# Patient Record
Sex: FEMALE | Race: BLACK OR AFRICAN AMERICAN | NOT HISPANIC OR LATINO | URBAN - METROPOLITAN AREA
[De-identification: names, ages, dates, MRNs, and addresses within clinical notes are randomized per-mention and may not be internally consistent; named-entity substitution may affect disease eponyms.]

---

## 2017-11-28 ENCOUNTER — EMERGENCY (EMERGENCY)
Facility: HOSPITAL | Age: 32
LOS: 1 days | End: 2017-11-28
Payer: MEDICAID

## 2017-11-28 PROCEDURE — 72125 CT NECK SPINE W/O DYE: CPT | Mod: 26

## 2017-11-28 PROCEDURE — 99284 EMERGENCY DEPT VISIT MOD MDM: CPT

## 2020-04-03 ENCOUNTER — EMERGENCY (EMERGENCY)
Facility: HOSPITAL | Age: 35
LOS: 0 days | Discharge: HOME | End: 2020-04-03
Payer: MEDICAID

## 2020-04-03 VITALS
OXYGEN SATURATION: 99 % | DIASTOLIC BLOOD PRESSURE: 57 MMHG | RESPIRATION RATE: 18 BRPM | TEMPERATURE: 99 F | HEART RATE: 79 BPM | SYSTOLIC BLOOD PRESSURE: 132 MMHG

## 2020-04-03 DIAGNOSIS — R43.9 UNSPECIFIED DISTURBANCES OF SMELL AND TASTE: ICD-10-CM

## 2020-04-03 DIAGNOSIS — U07.1 COVID-19: ICD-10-CM

## 2020-04-03 PROCEDURE — 99283 EMERGENCY DEPT VISIT LOW MDM: CPT

## 2020-04-03 PROCEDURE — 87635 SARS-COV-2 COVID-19 AMP PRB: CPT

## 2020-04-03 NOTE — ED STATDOCS - NS ED ROS FT
ROS: Constitutional- -fever, -chills.  Respiratory- -cough, -SOB  Cardiac- no chest pain, no palpitations, ENT- -rhinorrhea, no sore throat, no congestion.  Abdomen- No nausea, no vomiting, no diarrhea.  Urinary- no dysuria, no urgency, no frequency.  Skin- No rashes

## 2020-04-03 NOTE — ED STATDOCS - PATIENT PORTAL LINK FT
You can access the FollowMyHealth Patient Portal offered by [facility] by registering at the following website: [email or web address]. By joining [employer]’s FollowMyHealth portal, you will also be able to view your health information using other applications (apps) compatible with our system.

## 2020-04-03 NOTE — ED STATDOCS - OBJECTIVE STATEMENT
Pt with no PMH presents to ED with loss of smell and tastet x 5 days. 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 Pt here for testing.

## 2020-04-05 LAB — SARS-COV-2 RNA SPEC QL NAA+PROBE: (no result)

## 2022-03-15 ENCOUNTER — APPOINTMENT (OUTPATIENT)
Dept: PHYSICAL MEDICINE AND REHAB | Facility: CLINIC | Age: 37
End: 2022-03-15
Payer: MEDICAID

## 2022-03-15 VITALS
SYSTOLIC BLOOD PRESSURE: 102 MMHG | DIASTOLIC BLOOD PRESSURE: 70 MMHG | WEIGHT: 200 LBS | BODY MASS INDEX: 35.44 KG/M2 | HEIGHT: 63 IN | HEART RATE: 68 BPM | RESPIRATION RATE: 12 BRPM

## 2022-03-15 DIAGNOSIS — Z78.9 OTHER SPECIFIED HEALTH STATUS: ICD-10-CM

## 2022-03-15 PROCEDURE — 99204 OFFICE O/P NEW MOD 45 MIN: CPT

## 2022-03-15 RX ORDER — MELOXICAM 15 MG/1
15 TABLET ORAL
Qty: 30 | Refills: 1 | Status: ACTIVE | COMMUNITY
Start: 2022-03-15 | End: 1900-01-01

## 2022-03-15 RX ORDER — VALACYCLOVIR HYDROCHLORIDE 1 G/1
1 TABLET, FILM COATED ORAL
Refills: 0 | Status: ACTIVE | COMMUNITY

## 2022-03-15 NOTE — ASSESSMENT
[FreeTextEntry1] : 36 y.o. F w/ h/o chronic left-sided LBP w/ radiation into left buttock.  I spent most of today's office visit (35 min) discussing possible etiology, pathogenesis, further diagnostic work-up and non-operative management.  DDx includes lumbar facet syndrome vs. SI joint irritation.  Discussed R/B/A to short course of meloxicam 15 mg; one tab po qd x 1-2 weeks as tolerated.  Rx P.T. for modalities, gentle ROM, stretching and strengthening exercises.  May consider MRI L-spine to evaluate posterior elements (ie, FJs) if necessary.  May also consider left sided lumbar MBBs vs. SI joint LA block.  Pt. is in agreement with plan.  All questions answered.  RTC 6-8 weeks.

## 2022-03-15 NOTE — PHYSICAL EXAM
[FreeTextEntry1] : NAD\par A&Ox3\par ROM L-spine: near full flexion w/o pain; 10' passive extension w/ pain\par ROM Hips: smooth IR/ER w/o pain\par Pelvic tilt: slight\par Seated slump test: neg left\par SLR: +/- left\par MELVIN's: +/- left\par DTR's: 2+ knees/ankles\par MMT: 5/5 b/l LE\par Sensation: SILT\par Toe & Heel Walk: Yes\par Palpation: left SI joint TTP; left lower lumbar paravertebral muscles TTP\par

## 2022-03-15 NOTE — HISTORY OF PRESENT ILLNESS
[FreeTextEntry1] : 36 y.o. F presents to office w/ c/o LBP for last year.  Pt. denies antecedent trauma or injury to L-spine.  Pain can radiate into left hip when severe but not down left leg.  Denies N/T/B in legs or feet.  Pt. went to [facility] ED last month where x-rays were obtained.  Not available for my review.  No MRI.  Not taking any NSAIDs.  No P.T. or injections.

## 2022-03-17 ENCOUNTER — NON-APPOINTMENT (OUTPATIENT)
Age: 37
End: 2022-03-17

## 2022-04-06 ENCOUNTER — APPOINTMENT (OUTPATIENT)
Dept: PHYSICAL MEDICINE AND REHAB | Facility: CLINIC | Age: 37
End: 2022-04-06
Payer: MEDICAID

## 2022-04-06 VITALS
BODY MASS INDEX: 35.44 KG/M2 | HEIGHT: 63 IN | TEMPERATURE: 97.4 F | WEIGHT: 200 LBS | DIASTOLIC BLOOD PRESSURE: 78 MMHG | HEART RATE: 70 BPM | SYSTOLIC BLOOD PRESSURE: 122 MMHG

## 2022-04-06 PROCEDURE — 99214 OFFICE O/P EST MOD 30 MIN: CPT

## 2022-04-06 RX ORDER — NAPROXEN 375 MG/1
375 TABLET ORAL TWICE DAILY
Qty: 30 | Refills: 1 | Status: ACTIVE | COMMUNITY
Start: 2022-04-06 | End: 1900-01-01

## 2022-04-06 NOTE — PHYSICAL EXAM
[FreeTextEntry1] : NAD\par A&Ox3\par Obese\par No significant change in today's office visit\par ROM L-spine: near full flexion w/o pain; 10' passive extension w/ pain (static)\par ROM Hips: smooth IR/ER w/o pain\par Pelvic tilt: slight\par Seated slump test: neg left\par SLR: +/- left\par MELVIN's: +/- left\par DTR's: 2+ knees/ankles\par MMT: 5/5 b/l LE\par Sensation: SILT\par Toe & Heel Walk: Yes\par Palpation: left SI joint VTTP ; left lower lumbar paravertebral muscles TTP\par

## 2022-04-06 NOTE — ASSESSMENT
[FreeTextEntry1] : 36 y.o. F w/ h/o chronic left-sided LBP w/ radiation into left buttock.  I spent most of today's office visit (25 min) discussing possible etiology, pathogenesis, further diagnostic work-up and non-operative management.  DDx still includes lumbar facet syndrome vs. SI joint irritation.  Will obtain lumbar x-rays to evaluate for spondylosis.  Discussed R/B/A to to switching NSAID to naproxen 375 mg; one tab po qd x 1-2 weeks as tolerated.  Advised to c/w P.T. for modalities, gentle ROM, stretching and strengthening exercises.  May also consider left sided lumbar MBBs vs. SI joint LA block.  Pt. is in agreement with plan.  All questions answered.  Will have telephone f/u after x-rays.  Will remain out of work for next 6 weeks.

## 2022-04-06 NOTE — HISTORY OF PRESENT ILLNESS
[FreeTextEntry1] : 36 y.o. F w/ h/o chronic left-sided LBP w/ radiation into left buttock returns to office for f/u.  Pt. reports that she is still in pain.  States meloxicam was not helpful.  Pt. completed only 3 sessions of P.T. which provide transient relief of painful sxs.

## 2022-04-07 ENCOUNTER — OUTPATIENT (OUTPATIENT)
Dept: OUTPATIENT SERVICES | Facility: HOSPITAL | Age: 37
LOS: 1 days | End: 2022-04-07
Payer: MEDICAID

## 2022-04-07 ENCOUNTER — RESULT REVIEW (OUTPATIENT)
Age: 37
End: 2022-04-07

## 2022-04-07 DIAGNOSIS — M54.50 LOW BACK PAIN, UNSPECIFIED: ICD-10-CM

## 2022-04-07 PROCEDURE — 72100 X-RAY EXAM L-S SPINE 2/3 VWS: CPT | Mod: 26

## 2022-05-18 ENCOUNTER — APPOINTMENT (OUTPATIENT)
Dept: PHYSICAL MEDICINE AND REHAB | Facility: CLINIC | Age: 37
End: 2022-05-18
Payer: MEDICAID

## 2022-05-18 VITALS
BODY MASS INDEX: 35.44 KG/M2 | HEIGHT: 63 IN | TEMPERATURE: 97.1 F | SYSTOLIC BLOOD PRESSURE: 128 MMHG | DIASTOLIC BLOOD PRESSURE: 78 MMHG | WEIGHT: 200 LBS

## 2022-05-18 PROCEDURE — 99214 OFFICE O/P EST MOD 30 MIN: CPT

## 2022-05-18 NOTE — ASSESSMENT
[FreeTextEntry1] : 36 y.o. F w/ h/o chronic left-sided LBP w/ radiation into left buttock.  I spent most of today's office visit (25 min) reviewing the patient's x-rays, discussing possible etiology, pathogenesis, further diagnostic work-up and non-operative management.  DDx now includes L5-S1 DDD vs. SI joint irritation.  PO NSAIDs now contraindicated 2' pt's pregnancy.  Lumbar MBBs and SI joint LA block are also contraindicated.  May take low dose acetaminophen not to exceed 2 g/day.  May also trial OTC Lidoderm 4% patches.  Pt. is in agreement with plan.  All questions answered.  Will remain out of work for next 6 weeks.

## 2022-05-18 NOTE — HISTORY OF PRESENT ILLNESS
[FreeTextEntry1] : 36 y.o. F w/ h/o chronic left-sided LBP w/ radiation into left buttock returns to office for f/u.  Pt. states that her painful sxs are the same and have not significantly improved despite P.T.  Of note, pt. states that she believes that she is pregnant.  She is going to her OBGYN tomorrow to confirm her pregnancy.  She has yet to RTW.

## 2022-05-18 NOTE — DATA REVIEWED
[Plain X-Rays] : plain X-Rays [FreeTextEntry1] : X-rays L-spine (April 2022): No compression fractures, spondylolistheses, or spondylolysis defects.  Narrowed L5-S1 disc space. Preserved remaining intervertebral disc spaces. Unremarkable SI joints and partially visualized hips.\par No lytic or blastic lesions.

## 2022-06-13 ENCOUNTER — EMERGENCY (EMERGENCY)
Facility: HOSPITAL | Age: 37
LOS: 1 days | Discharge: HOME | End: 2022-06-13
Attending: EMERGENCY MEDICINE
Payer: COMMERCIAL

## 2022-06-13 VITALS
SYSTOLIC BLOOD PRESSURE: 123 MMHG | RESPIRATION RATE: 20 BRPM | DIASTOLIC BLOOD PRESSURE: 79 MMHG | OXYGEN SATURATION: 96 % | TEMPERATURE: 98 F | WEIGHT: 192.9 LBS | HEART RATE: 84 BPM

## 2022-06-13 PROCEDURE — 99284 EMERGENCY DEPT VISIT MOD MDM: CPT

## 2022-06-13 RX ORDER — LIDOCAINE 4 G/100G
1 CREAM TOPICAL ONCE
Refills: 0 | Status: COMPLETED | OUTPATIENT
Start: 2022-06-13 | End: 2022-06-13

## 2022-06-13 RX ORDER — DEXAMETHASONE 0.5 MG/5ML
6 ELIXIR ORAL ONCE
Refills: 0 | Status: COMPLETED | OUTPATIENT
Start: 2022-06-13 | End: 2022-06-13

## 2022-06-13 RX ORDER — OXYCODONE AND ACETAMINOPHEN 5; 325 MG/1; MG/1
1 TABLET ORAL ONCE
Refills: 0 | Status: DISCONTINUED | OUTPATIENT
Start: 2022-06-13 | End: 2022-06-13

## 2022-06-13 RX ORDER — KETOROLAC TROMETHAMINE 30 MG/ML
30 SYRINGE (ML) INJECTION ONCE
Refills: 0 | Status: DISCONTINUED | OUTPATIENT
Start: 2022-06-13 | End: 2022-06-13

## 2022-06-13 RX ORDER — DIAZEPAM 5 MG
5 TABLET ORAL ONCE
Refills: 0 | Status: DISCONTINUED | OUTPATIENT
Start: 2022-06-13 | End: 2022-06-13

## 2022-06-13 RX ORDER — METHOCARBAMOL 500 MG/1
1500 TABLET, FILM COATED ORAL ONCE
Refills: 0 | Status: COMPLETED | OUTPATIENT
Start: 2022-06-13 | End: 2022-06-13

## 2022-06-13 RX ADMIN — OXYCODONE AND ACETAMINOPHEN 1 TABLET(S): 5; 325 TABLET ORAL at 23:38

## 2022-06-13 RX ADMIN — OXYCODONE AND ACETAMINOPHEN 1 TABLET(S): 5; 325 TABLET ORAL at 22:09

## 2022-06-13 RX ADMIN — Medication 6 MILLIGRAM(S): at 22:09

## 2022-06-13 RX ADMIN — LIDOCAINE 1 PATCH: 4 CREAM TOPICAL at 21:08

## 2022-06-13 RX ADMIN — Medication 30 MILLIGRAM(S): at 21:07

## 2022-06-13 RX ADMIN — Medication 5 MILLIGRAM(S): at 23:20

## 2022-06-13 RX ADMIN — METHOCARBAMOL 1500 MILLIGRAM(S): 500 TABLET, FILM COATED ORAL at 21:08

## 2022-06-13 NOTE — ED STATDOCS - ATTENDING CONTRIBUTION TO CARE
Acute on chronic L lower back pain worse from twisting movement with reproducible tenderness, neuro intact, no red flags to suggest epidural abscess/hematoma/cord compression, pain control, reassess. Evette Sandhu DO     I performed a history and physical exam of the patient and discussed their management with the resident. I reviewed the resident's note and agree with the documented findings and plan of care. My medical decision making and observations are found above.

## 2022-06-13 NOTE — ED STATDOCS - PATIENT PORTAL LINK FT
You can access the FollowMyHealth Patient Portal offered by [facility] by registering at the following website: [email or web address]. By joining [employer]’s FollowMyHealth portal, you will also be able to view your health information using other applications (apps) compatible with our system.

## 2022-06-13 NOTE — ED ADULT NURSE NOTE - OBJECTIVE STATEMENT
Assumed care of pt at 2110 in . Pt A&Ox4 c/o left lower back pain that started when she was at therapy for her back but she twisted it today and now is in a lot of pain, pt states t5hat she has been going to therapy for the back since the anti-inflammatory prescribed has not been helping, pt states the pain has been coming and going but today it has been significantly worse, pt resting comfortably showing no signs of respiratory distress

## 2022-06-13 NOTE — ED STATDOCS - NSFOLLOWUPCLINICS_GEN_ALL_ED_FT
[facility]  Ortho/Spine  [address] [place] [address]  Phone: [phone]  Fax:

## 2022-06-13 NOTE — ED ADULT TRIAGE NOTE - CHIEF COMPLAINT QUOTE
C/O left lower back pain that increased today while at physical therapy today.  Pt states she twisted the wrong way and the pain increased.

## 2022-06-13 NOTE — ED STATDOCS - ADDITIONAL NOTES AND INSTRUCTIONS:
PT was evaluated At [facility] ED and was found to have a condition that warranted time of to rest and heal from WORK/SCHOOL.   Richard Churchill PA-C

## 2022-06-13 NOTE — ED STATDOCS - PROGRESS NOTE DETAILS
PT signed out to FARTUN pitt after lengthy discussion about case with Dr. Call. Richard Churchill PA-C: PT seen resting comfortably in no acute distress, no acute complaints at this time, PT tolerating PO intake,  PT informed of all results, Pt with sig clinical improvement, MS: no midline ttp. strength intact, JESÚS, no palpable bony abnormalities. neuro: A&O x 3, CN II-Xii GI, MAEx 4,  5/5/ motors, = sensation, no pronator drift or ataxia. Pt with no red flags for back pain, neuro vasc intact, Pt to be dc home with supportive care, follow up to spinal center, Pt educated about when to return to the ED if needed. PT verbalizes that he understands all instructions and results. Pt informed that ED is open and available 24/7 365 days a yr, encouraged to return to the ED if they have any change in condition, or feel the need for revaluation.

## 2022-06-13 NOTE — ED STATDOCS - PHYSICAL EXAMINATION
Gen: Well appearing in NAD  Head: NC/AT  Neck: Trachea midline  Resp: No distress  Ext: No deformities  Back: L paraspinal TTP midback/lumbar  Skin: Warm and dry as visualized  Psych: Normal affect and mood

## 2022-06-13 NOTE — ED STATDOCS - OBJECTIVE STATEMENT
Addended by: MANJU REYES on: 11/9/2018 12:31 PM     Modules accepted: Orders    
Patient is a 35yo F presenting with back pain. She states that she has chronic back pain on the L side, does not take much medication for pain. She reports that she went to her OT due to her back pain aching for the past week. She went to the appt and then twisted wrong and had significant sharp L lower back pain. She denies weakness, numbness, incontinence.

## 2022-06-14 VITALS
DIASTOLIC BLOOD PRESSURE: 61 MMHG | RESPIRATION RATE: 19 BRPM | SYSTOLIC BLOOD PRESSURE: 103 MMHG | OXYGEN SATURATION: 95 % | HEART RATE: 81 BPM

## 2022-06-14 PROCEDURE — 99283 EMERGENCY DEPT VISIT LOW MDM: CPT

## 2022-06-14 RX ORDER — LIDOCAINE 4 G/100G
1 CREAM TOPICAL
Qty: 4 | Refills: 0
Start: 2022-06-14 | End: 2022-06-17

## 2022-06-14 RX ORDER — DIAZEPAM 5 MG
1 TABLET ORAL
Qty: 6 | Refills: 0
Start: 2022-06-14 | End: 2022-06-16

## 2022-06-14 RX ORDER — IBUPROFEN 200 MG
600 TABLET ORAL ONCE
Refills: 0 | Status: COMPLETED | OUTPATIENT
Start: 2022-06-14 | End: 2022-06-14

## 2022-06-14 RX ORDER — IBUPROFEN 200 MG
1 TABLET ORAL
Qty: 20 | Refills: 0
Start: 2022-06-14 | End: 2022-06-18

## 2022-06-14 RX ORDER — GABAPENTIN 400 MG/1
1 CAPSULE ORAL
Qty: 21 | Refills: 0
Start: 2022-06-14 | End: 2022-06-20

## 2022-06-14 RX ORDER — GABAPENTIN 400 MG/1
300 CAPSULE ORAL ONCE
Refills: 0 | Status: COMPLETED | OUTPATIENT
Start: 2022-06-14 | End: 2022-06-14

## 2022-06-14 RX ADMIN — Medication 600 MILLIGRAM(S): at 04:04

## 2022-06-14 RX ADMIN — GABAPENTIN 300 MILLIGRAM(S): 400 CAPSULE ORAL at 04:04

## 2022-06-14 NOTE — ED ADULT NURSE REASSESSMENT NOTE - NS ED NURSE REASSESS COMMENT FT1
pt resting comfortably in bed showing no signs of respiratory distress or pain, pt is calm and cooperative

## 2022-06-16 ENCOUNTER — APPOINTMENT (OUTPATIENT)
Dept: PHYSICAL MEDICINE AND REHAB | Facility: CLINIC | Age: 37
End: 2022-06-16
Payer: MEDICAID

## 2022-06-16 VITALS — HEART RATE: 106 BPM | OXYGEN SATURATION: 100 % | DIASTOLIC BLOOD PRESSURE: 85 MMHG | SYSTOLIC BLOOD PRESSURE: 135 MMHG

## 2022-06-16 PROCEDURE — 99214 OFFICE O/P EST MOD 30 MIN: CPT

## 2022-06-16 NOTE — ASSESSMENT
[FreeTextEntry1] : 36 y.o. F w/ h/o chronic left-sided LBP w/ radiation into left buttock w/ recent exacerbation requiring ED admission. I spent most of today's office visit (25 min) discussing possible etiology, pathogenesis, further diagnostic work-up and non-operative management.  DDx still includes L5-S1 DDD/HNP vs. SI joint irritation.  Will obtain MRI L-spine to r/o HNP/NF stenosis.  Pt. may finish course of oral prednisone Rx'd by ED MD but advised against taking it w/ PO NSAIDs 2' increased risk of GI bleeding.  Further advised not to take gabapentin w/ benzo 2' increased risk of CNS depression.  May c/w OTC Lidoderm 4% patches.  Pt. is in agreement with plan.  All questions answered.  Will have telehealth f/u for MRI review.

## 2022-06-16 NOTE — HISTORY OF PRESENT ILLNESS
[FreeTextEntry1] : 36 y.o. F w/ h/o chronic left-sided LBP w/ radiation into left buttock returns to office for f/u.  Pt's interim medical hx is significant for elective termination of pregnancy.  Pt. also states that she had to go to [facility] ED during her last P.T. session on 6/13/22 2' increasing LBP radiating into let leg.  No spinal imaging done.  Pt. was Rx'd oral prednisone, gabapentin, ibuprofen and diazepam.

## 2022-06-16 NOTE — PHYSICAL EXAM
[FreeTextEntry1] : NAD\par A&Ox3\par Obese\par No significant change in today's office visit\par ROM L-spine: near full flexion w/o pain; 10' passive extension w/ pain (static)\par ROM Hips: smooth IR/ER w/o pain\par Pelvic tilt: slight\par Seated slump test: neg left\par SLR: +/- left\par MELVIN's: +/- left\par DTR's: 2+ knees/ankles (stable)\par MMT: 5/5 b/l LE (stable)\par Sensation: SILT\par Toe & Heel Walk: Yes (stable)\par Palpation: left SI joint VTTP (chronic); left lower lumbar paravertebral muscles TTP\par

## 2022-06-23 ENCOUNTER — OUTPATIENT (OUTPATIENT)
Dept: OUTPATIENT SERVICES | Facility: HOSPITAL | Age: 37
LOS: 1 days | End: 2022-06-23

## 2022-06-23 ENCOUNTER — APPOINTMENT (OUTPATIENT)
Dept: MRI IMAGING | Facility: CLINIC | Age: 37
End: 2022-06-23
Payer: MEDICAID

## 2022-06-23 DIAGNOSIS — M54.16 RADICULOPATHY, LUMBAR REGION: ICD-10-CM

## 2022-06-23 PROCEDURE — 72148 MRI LUMBAR SPINE W/O DYE: CPT | Mod: 26

## 2022-06-30 ENCOUNTER — APPOINTMENT (OUTPATIENT)
Dept: PHYSICAL MEDICINE AND REHAB | Facility: CLINIC | Age: 37
End: 2022-06-30

## 2022-06-30 DIAGNOSIS — Z01.812 ENCOUNTER FOR PREPROCEDURAL LABORATORY EXAMINATION: ICD-10-CM

## 2022-06-30 PROCEDURE — 99214 OFFICE O/P EST MOD 30 MIN: CPT | Mod: 95

## 2022-06-30 NOTE — DATA REVIEWED
[Plain X-Rays] : plain X-Rays [MRI] : MRI [FreeTextEntry1] : MRI L-spine (June 2022): L5/S1 central disc protrusion with disc material contacting the bilateral descending S1 nerve roots, with mild/moderate narrowing of the lateral recesses.  Otherwise no significant posterior disc herniation, central canal, or neural foraminal stenosis.\par \par X-rays L-spine (April 2022): No compression fractures, spondylolistheses, or spondylolysis defects.  Narrowed L5-S1 disc space. Preserved remaining intervertebral disc spaces. Unremarkable SI joints and partially visualized hips.\par No lytic or blastic lesions.

## 2022-06-30 NOTE — ASSESSMENT
[FreeTextEntry1] : 36 y.o. F w/ h/o chronic left-sided LBP w/ radiation into left buttock.  I spent most of today's telehealth visit (20 min) reviewing the patient's MRI, discussing etiology, pathogenesis and further non-operative management.  MRI c/w L5-S1 DDD/HNP w/ contact of b/l descending S1 NRs.  Gabapentin has not been efficacious.  Discussed R/B/A to LEFT L5-S1 TFESI under fluoroscopy which patient has agreed to.  Advised to hold off on P.T. until after injection.  May c/w OTC Lidoderm 4% patches.  Pt. is in agreement with plan.  All questions answered.  RTC 2 weeks post procedure.

## 2022-06-30 NOTE — REASON FOR VISIT
[Follow-Up] : a follow-up visit [Home] : at home, [unfilled] , at the time of the visit. [Other Location: e.g. Home (Enter Location, City,State)___] : at [unfilled] [Patient] : the patient

## 2022-06-30 NOTE — HISTORY OF PRESENT ILLNESS
[FreeTextEntry1] : 36 y.o. F w/ h/o chronic left-sided LBP w/ radiation into left buttock presents for telehealth f/u for MRI review.  Results detailed below.

## 2022-07-31 ENCOUNTER — TRANSCRIPTION ENCOUNTER (OUTPATIENT)
Age: 37
End: 2022-07-31

## 2022-07-31 LAB — SARS-COV-2 N GENE NPH QL NAA+PROBE: NOT DETECTED

## 2022-08-01 ENCOUNTER — APPOINTMENT (OUTPATIENT)
Dept: PHYSICAL MEDICINE AND REHAB | Facility: GI CENTER | Age: 37
End: 2022-08-01

## 2022-08-01 ENCOUNTER — OUTPATIENT (OUTPATIENT)
Dept: OUTPATIENT SERVICES | Facility: HOSPITAL | Age: 37
LOS: 1 days | End: 2022-08-01
Payer: COMMERCIAL

## 2022-08-01 DIAGNOSIS — M54.16 RADICULOPATHY, LUMBAR REGION: ICD-10-CM

## 2022-08-01 PROCEDURE — 64483 NJX AA&/STRD TFRM EPI L/S 1: CPT | Mod: LT

## 2022-08-01 PROCEDURE — 76000 FLUOROSCOPY <1 HR PHYS/QHP: CPT

## 2022-08-12 ENCOUNTER — APPOINTMENT (OUTPATIENT)
Dept: PHYSICAL MEDICINE AND REHAB | Facility: CLINIC | Age: 37
End: 2022-08-12

## 2022-08-12 VITALS
SYSTOLIC BLOOD PRESSURE: 127 MMHG | HEIGHT: 63 IN | HEART RATE: 94 BPM | BODY MASS INDEX: 35.44 KG/M2 | DIASTOLIC BLOOD PRESSURE: 84 MMHG | OXYGEN SATURATION: 99 % | WEIGHT: 200 LBS

## 2022-08-12 DIAGNOSIS — M54.50 LOW BACK PAIN, UNSPECIFIED: ICD-10-CM

## 2022-08-12 DIAGNOSIS — G89.29 LOW BACK PAIN, UNSPECIFIED: ICD-10-CM

## 2022-08-12 PROCEDURE — 99214 OFFICE O/P EST MOD 30 MIN: CPT

## 2022-08-12 NOTE — ASSESSMENT
[FreeTextEntry1] : 36 y.o. F w/ h/o chronic left-sided LBP w/ radiation into left buttock.  I spent most of today's office visit (25 min) reviewing the patient's MRI, discussing etiology, pathogenesis and further non-operative vs. operative management.  MRI c/w L5-S1 DDD/HNP w/ contact of b/l descending S1 NRs.  Pt. had partial relief of left buttock pain s/p L L5-S1 TFESI but a majority of her discomfort remains.  Gabapentin has not been efficacious.  Would not repeat TFESI given lack of true radicular pain sxs.  Discussed NSGY consultation for consideration of MLD w/ or w/o fusion.  Will refer to Dr. Centeno.  Advised to hold off on P.T. for now.  May c/w OTC Lidoderm 4% patches.  Will send for FCE for RTW guidance.  Pt. is in agreement with plan.  All questions answered.  RTC after above.

## 2022-08-12 NOTE — HISTORY OF PRESENT ILLNESS
[FreeTextEntry1] : 36 y.o. F w/ h/o chronic left-sided LBP w/ radiation into left buttock returns to office for f/u s/p L L5-S1 TFESI (8/1/22).  Pt. reports approximately 25% symptomatic improvement post procedure.  Describes left sided lower back and buttock pain as "pressure" discomfort.  Denies N/T/B down left leg.

## 2022-09-12 ENCOUNTER — APPOINTMENT (OUTPATIENT)
Dept: NEUROSURGERY | Facility: CLINIC | Age: 37
End: 2022-09-12

## 2022-09-12 VITALS
WEIGHT: 202 LBS | SYSTOLIC BLOOD PRESSURE: 115 MMHG | HEIGHT: 63 IN | BODY MASS INDEX: 35.79 KG/M2 | DIASTOLIC BLOOD PRESSURE: 80 MMHG | HEART RATE: 81 BPM | OXYGEN SATURATION: 97 % | TEMPERATURE: 97.6 F

## 2022-09-12 PROCEDURE — 99203 OFFICE O/P NEW LOW 30 MIN: CPT

## 2022-09-12 NOTE — DATA REVIEWED
[de-identified] : EXAM: [id number] - MR SPINE LUMBAR - ORDERED BY: ZEENAT PEÑALOZA\par \par \par PROCEDURE DATE: 06/23/2022\par \par \par \par INTERPRETATION: EXAMINATION: MR LUMBAR SPINE\par \par CLINICAL INDICATION: Back pain radiating down into the left leg. Evaluate for L5-S1 herniated disc.\par \par TECHNIQUE: Multi-planar, multi-sequence MR of the lumbar spine was performed without intravenous contrast.\par \par COMPARISON: Radiographs of the lumbar spine dated 4/7/2022\par \par INTERPRETATION:\par \par BONES AND BONE MARROW: There is no evidence of fracture. There is mild endplate discogenic edema at L5-S1 with fatty endplate change.\par INTERVERTEBRAL DISCS: There is disc degeneration with loss of disc height at L5-S1. The remaining levels are normal in appearance.\par ALIGNMENT: The spinal alignment is maintained.\par CONUS AND CAUDA EQUINA: The conus medullaris is normal in size, signal and location. The conus terminates at the L1 level.\par EXTRASPINAL: There is no epidural mass or fluid collection. The paraspinal and included extraspinal soft tissues are unremarkable.\par \par Evaluation of individual lumbar disc space levels demonstrates the following:\par \par L1/L2, There is no significant spinal canal or neural foraminal stenosis.\par L2/L3, There is no significant spinal canal or neural foraminal stenosis.\par L3/L4, There is no significant spinal canal or neural foraminal stenosis.\par L4/L5, There is no significant spinal canal or neural foraminal stenosis.\par L5/S1, central disc protrusion with disc material contacting the bilateral descending S1 nerve roots with mild/moderate narrowing of the lateral recesses.\par \par IMPRESSION:\par L5/S1 central disc protrusion with disc material contacting the bilateral descending S1 nerve roots, with mild/moderate narrowing of the lateral recesses.\par \par Otherwise no significant posterior disc herniation, central canal, or neural foraminal stenosis.

## 2022-09-12 NOTE — HISTORY OF PRESENT ILLNESS
[> 3 months] : more  than 3 months [FreeTextEntry1] : Left leg radicular pain [de-identified] : JOSE SHERIDAN is a 36 year old female presents for initial neurosurgical evaluation of left leg radiculopathy.  No significant medical history.  Patient mentions she has longstanding issues of lower back pain.  She recalls that she was involved in a slip and fall sometime in October 2021 at work which exacerbated her symptoms.  She endorses she has lower back pain which radiates into the left gluteal region and posterior lateral thigh and does not extend past the knee.  She describes it as constant burning and throbbing in nature.  She has no right leg pain.  She endorses that the left leg pain is greater than the lower back pain.  Pain intensity is 7 out of 10.  She denies any urinary or bowel incontinence.  She is ambulating independently but is limited secondary to her leg pain.  She states that she had initiated some physical therapy but it exacerbated her pain so severe that she presented to [facility].  X-ray was obtained no acute findings and she was recommended to follow-up with PMNR.  She has been under the care of Dr. Burris and she has completed an lumbar epidural around August 1, 2022 which did not offer significant relief of her symptoms.  She states she has tried a plethora of anti-inflammatory such as meloxicam, muscle muscle relaxers and has failed many.  At this time she is not taking any given she is frustrated with all the medications she has failed.  She is here to discuss if there are any surgical options.\par

## 2022-09-12 NOTE — REVIEW OF SYSTEMS
[As Noted in HPI] : as noted in HPI [Numbness] : numbness [Tingling] : tingling [Negative] : Heme/Lymph [Leg Weakness] : leg weakness

## 2022-09-12 NOTE — ASSESSMENT
[FreeTextEntry1] : Ms. Talbot presents with above history and imaging.  .  I reviewed her MRI and will see her back upon completion of a CT of the LS spine as well as LS spine flexion-extension x-rays to assess for dynamic instability.  Based on the above, I will discuss the possible options of left L5-S1 discectomy versus L5-S1 PLIF.\par In the interim she will continue her follow-up with Dr. Burris.  We will see her back upon completion of her additional imaging.\par Patient has been given an opportunity to ask and have their questions answered to their satisfaction.\par Patient knows to call the office if there are any new or worsening symptoms.\par \par \par \par I, Dr. Marty Back, personally performed the evaluation and management (E/M) services for this patient.  That E/M includes assessment of the initial examination, assessing the pertinent medical and surgical history, and establishing the plan of care.  At the time of the visit, my ACP, Bibi Ott, actively participated in the evaluation and management services for this patient to be followed going forward in accordance with the plan documented in the clinical note.\par \par \par \par \par

## 2022-10-01 ENCOUNTER — APPOINTMENT (OUTPATIENT)
Dept: CT IMAGING | Facility: CLINIC | Age: 37
End: 2022-10-01

## 2022-10-01 ENCOUNTER — RESULT REVIEW (OUTPATIENT)
Age: 37
End: 2022-10-01

## 2022-10-01 ENCOUNTER — OUTPATIENT (OUTPATIENT)
Dept: OUTPATIENT SERVICES | Facility: HOSPITAL | Age: 37
LOS: 1 days | End: 2022-10-01

## 2022-10-01 DIAGNOSIS — M54.16 RADICULOPATHY, LUMBAR REGION: ICD-10-CM

## 2022-10-01 PROCEDURE — 76376 3D RENDER W/INTRP POSTPROCES: CPT | Mod: 26

## 2022-10-01 PROCEDURE — 72131 CT LUMBAR SPINE W/O DYE: CPT | Mod: 26

## 2022-10-05 ENCOUNTER — NON-APPOINTMENT (OUTPATIENT)
Age: 37
End: 2022-10-05

## 2022-11-17 ENCOUNTER — APPOINTMENT (OUTPATIENT)
Dept: NEUROSURGERY | Facility: CLINIC | Age: 37
End: 2022-11-17

## 2022-12-15 ENCOUNTER — APPOINTMENT (OUTPATIENT)
Dept: NEUROSURGERY | Facility: CLINIC | Age: 37
End: 2022-12-15

## 2022-12-15 VITALS
HEART RATE: 93 BPM | HEIGHT: 63 IN | DIASTOLIC BLOOD PRESSURE: 82 MMHG | BODY MASS INDEX: 37.21 KG/M2 | TEMPERATURE: 98 F | OXYGEN SATURATION: 98 % | WEIGHT: 210 LBS | SYSTOLIC BLOOD PRESSURE: 118 MMHG

## 2022-12-15 DIAGNOSIS — M54.16 RADICULOPATHY, LUMBAR REGION: ICD-10-CM

## 2022-12-15 PROCEDURE — 99213 OFFICE O/P EST LOW 20 MIN: CPT

## 2022-12-15 NOTE — DATA REVIEWED
[de-identified] : EXAM: [id number] - MR SPINE LUMBAR - ORDERED BY: ZEENAT PEÑALOZA\par \par \par PROCEDURE DATE: 06/23/2022\par \par \par \par INTERPRETATION: EXAMINATION: MR LUMBAR SPINE\par \par CLINICAL INDICATION: Back pain radiating down into the left leg. Evaluate for L5-S1 herniated disc.\par \par TECHNIQUE: Multi-planar, multi-sequence MR of the lumbar spine was performed without intravenous contrast.\par \par COMPARISON: Radiographs of the lumbar spine dated 4/7/2022\par \par INTERPRETATION:\par \par BONES AND BONE MARROW: There is no evidence of fracture. There is mild endplate discogenic edema at L5-S1 with fatty endplate change.\par INTERVERTEBRAL DISCS: There is disc degeneration with loss of disc height at L5-S1. The remaining levels are normal in appearance.\par ALIGNMENT: The spinal alignment is maintained.\par CONUS AND CAUDA EQUINA: The conus medullaris is normal in size, signal and location. The conus terminates at the L1 level.\par EXTRASPINAL: There is no epidural mass or fluid collection. The paraspinal and included extraspinal soft tissues are unremarkable.\par \par Evaluation of individual lumbar disc space levels demonstrates the following:\par \par L1/L2, There is no significant spinal canal or neural foraminal stenosis.\par L2/L3, There is no significant spinal canal or neural foraminal stenosis.\par L3/L4, There is no significant spinal canal or neural foraminal stenosis.\par L4/L5, There is no significant spinal canal or neural foraminal stenosis.\par L5/S1, central disc protrusion with disc material contacting the bilateral descending S1 nerve roots with mild/moderate narrowing of the lateral recesses.\par \par IMPRESSION:\par L5/S1 central disc protrusion with disc material contacting the bilateral descending S1 nerve roots, with mild/moderate narrowing of the lateral recesses.\par \par Otherwise no significant posterior disc herniation, central canal, or neural foraminal stenosis.

## 2022-12-15 NOTE — HISTORY OF PRESENT ILLNESS
[> 3 months] : more  than 3 months [FreeTextEntry1] : Left leg radicular pain [de-identified] : JOSE SHERIDAN is a 36 year old female presents for follow up visit for complaints of left leg radiculopathy.  No significant medical history.  Patient mentions she has longstanding issues of lower back pain.  She recalls that she was involved in a slip and fall sometime in October 2021 at work which exacerbated her symptoms.  She endorses she has lower back pain which radiates into the left gluteal region and posterior lateral thigh and does not extend past the knee.  She describes it as constant burning and throbbing in nature.  She has no right leg pain.  She endorses that the left leg pain is greater than the lower back pain.  Pain intensity is 7 out of 10.  She denies any urinary or bowel incontinence.  She is ambulating independently but is limited secondary to her leg pain.  She states that she had initiated some physical therapy but it exacerbated her pain so severe that she presented to [facility].  X-ray was obtained no acute findings and she was recommended to follow-up with PMNR.  She has been under the care of Dr. Burris and she has completed an lumbar epidural around August 1, 2022 which did not offer significant relief of her symptoms.  She states she has tried a plethora of anti-inflammatory such as meloxicam, muscle muscle relaxers and has failed many.  At this time she is not taking any given she is frustrated with all the medications she has failed.  She is here to discuss if there are any surgical options.\par

## 2022-12-15 NOTE — REVIEW OF SYSTEMS
[As Noted in HPI] : as noted in HPI [Leg Weakness] : leg weakness [Numbness] : numbness [Tingling] : tingling [Negative] : Heme/Lymph

## 2022-12-15 NOTE — REASON FOR VISIT
[Follow-Up: _____] : a [unfilled] follow-up visit [New Patient Visit] : a new patient visit [FreeTextEntry1] : Left leg radiculopathy

## 2022-12-20 ENCOUNTER — APPOINTMENT (OUTPATIENT)
Dept: MRI IMAGING | Facility: CLINIC | Age: 37
End: 2022-12-20

## 2022-12-20 ENCOUNTER — OUTPATIENT (OUTPATIENT)
Dept: OUTPATIENT SERVICES | Facility: HOSPITAL | Age: 37
LOS: 1 days | End: 2022-12-20

## 2022-12-20 DIAGNOSIS — Z00.8 ENCOUNTER FOR OTHER GENERAL EXAMINATION: ICD-10-CM

## 2025-03-19 ENCOUNTER — APPOINTMENT (OUTPATIENT)
Dept: NEUROLOGY | Facility: CLINIC | Age: 40
End: 2025-03-19
Payer: MEDICAID

## 2025-03-19 VITALS
BODY MASS INDEX: 34.55 KG/M2 | DIASTOLIC BLOOD PRESSURE: 86 MMHG | HEART RATE: 92 BPM | OXYGEN SATURATION: 98 % | HEIGHT: 63 IN | WEIGHT: 195 LBS | SYSTOLIC BLOOD PRESSURE: 131 MMHG

## 2025-03-19 DIAGNOSIS — M54.16 RADICULOPATHY, LUMBAR REGION: ICD-10-CM

## 2025-03-19 DIAGNOSIS — M54.50 LOW BACK PAIN, UNSPECIFIED: ICD-10-CM

## 2025-03-19 DIAGNOSIS — G89.29 LOW BACK PAIN, UNSPECIFIED: ICD-10-CM

## 2025-03-19 PROCEDURE — 99203 OFFICE O/P NEW LOW 30 MIN: CPT

## 2025-03-19 RX ORDER — GABAPENTIN 100 MG/1
100 CAPSULE ORAL
Qty: 90 | Refills: 4 | Status: ACTIVE | COMMUNITY
Start: 2025-03-19 | End: 1900-01-01

## 2025-04-16 ENCOUNTER — APPOINTMENT (OUTPATIENT)
Dept: PHYSICAL MEDICINE AND REHAB | Facility: CLINIC | Age: 40
End: 2025-04-16
Payer: MEDICAID

## 2025-04-16 VITALS
RESPIRATION RATE: 14 BRPM | DIASTOLIC BLOOD PRESSURE: 89 MMHG | HEART RATE: 83 BPM | BODY MASS INDEX: 33.66 KG/M2 | HEIGHT: 63 IN | SYSTOLIC BLOOD PRESSURE: 131 MMHG | WEIGHT: 190 LBS

## 2025-04-16 PROCEDURE — 99204 OFFICE O/P NEW MOD 45 MIN: CPT

## 2025-04-16 RX ORDER — NAPROXEN 500 MG/1
500 TABLET ORAL
Qty: 60 | Refills: 0 | Status: ACTIVE | COMMUNITY
Start: 2025-04-16 | End: 1900-01-01

## 2025-04-16 RX ORDER — PREGABALIN 25 MG/1
25 CAPSULE ORAL
Qty: 30 | Refills: 0 | Status: ACTIVE | COMMUNITY
Start: 2025-04-16 | End: 1900-01-01

## 2025-04-17 ENCOUNTER — OUTPATIENT (OUTPATIENT)
Dept: OUTPATIENT SERVICES | Facility: HOSPITAL | Age: 40
LOS: 1 days | End: 2025-04-17
Payer: MEDICAID

## 2025-04-17 ENCOUNTER — APPOINTMENT (OUTPATIENT)
Dept: MRI IMAGING | Facility: CLINIC | Age: 40
End: 2025-04-17

## 2025-04-17 DIAGNOSIS — M54.50 LOW BACK PAIN, UNSPECIFIED: ICD-10-CM

## 2025-04-17 PROCEDURE — 72148 MRI LUMBAR SPINE W/O DYE: CPT | Mod: 26

## 2025-04-23 ENCOUNTER — APPOINTMENT (OUTPATIENT)
Dept: PHYSICAL MEDICINE AND REHAB | Facility: CLINIC | Age: 40
End: 2025-04-23
Payer: MEDICAID

## 2025-04-23 VITALS
OXYGEN SATURATION: 98 % | RESPIRATION RATE: 15 BRPM | WEIGHT: 210 LBS | BODY MASS INDEX: 37.21 KG/M2 | DIASTOLIC BLOOD PRESSURE: 88 MMHG | HEART RATE: 71 BPM | HEIGHT: 63 IN | SYSTOLIC BLOOD PRESSURE: 141 MMHG

## 2025-04-23 DIAGNOSIS — M54.16 RADICULOPATHY, LUMBAR REGION: ICD-10-CM

## 2025-04-23 DIAGNOSIS — M54.50 LOW BACK PAIN, UNSPECIFIED: ICD-10-CM

## 2025-04-23 DIAGNOSIS — G89.29 LOW BACK PAIN, UNSPECIFIED: ICD-10-CM

## 2025-04-23 PROCEDURE — 99214 OFFICE O/P EST MOD 30 MIN: CPT

## 2025-05-21 ENCOUNTER — RX RENEWAL (OUTPATIENT)
Age: 40
End: 2025-05-21

## 2025-05-21 ENCOUNTER — APPOINTMENT (OUTPATIENT)
Dept: NEUROLOGY | Facility: CLINIC | Age: 40
End: 2025-05-21

## 2025-06-18 ENCOUNTER — APPOINTMENT (OUTPATIENT)
Dept: PHYSICAL MEDICINE AND REHAB | Facility: CLINIC | Age: 40
End: 2025-06-18
Payer: MEDICAID

## 2025-06-18 VITALS
BODY MASS INDEX: 37.21 KG/M2 | HEART RATE: 77 BPM | WEIGHT: 210 LBS | RESPIRATION RATE: 15 BRPM | SYSTOLIC BLOOD PRESSURE: 127 MMHG | DIASTOLIC BLOOD PRESSURE: 90 MMHG | HEIGHT: 63 IN

## 2025-06-18 PROCEDURE — 99214 OFFICE O/P EST MOD 30 MIN: CPT

## 2025-06-18 PROCEDURE — G2211 COMPLEX E/M VISIT ADD ON: CPT | Mod: NC

## 2025-06-18 RX ORDER — PREGABALIN 25 MG/1
25 CAPSULE ORAL
Qty: 60 | Refills: 0 | Status: ACTIVE | COMMUNITY
Start: 2025-06-18 | End: 1900-01-01

## 2025-07-10 ENCOUNTER — APPOINTMENT (OUTPATIENT)
Dept: PHYSICAL MEDICINE AND REHAB | Facility: AMBULATORY SURGERY CENTER | Age: 40
End: 2025-07-10

## 2025-08-07 ENCOUNTER — APPOINTMENT (OUTPATIENT)
Dept: PHYSICAL MEDICINE AND REHAB | Facility: GI CENTER | Age: 40
End: 2025-08-07
Payer: MEDICAID

## 2025-08-07 ENCOUNTER — OUTPATIENT (OUTPATIENT)
Dept: OUTPATIENT SERVICES | Facility: HOSPITAL | Age: 40
LOS: 1 days | End: 2025-08-07
Payer: COMMERCIAL

## 2025-08-07 DIAGNOSIS — M54.16 RADICULOPATHY, LUMBAR REGION: ICD-10-CM

## 2025-08-07 DIAGNOSIS — G89.29 LOW BACK PAIN, UNSPECIFIED: ICD-10-CM

## 2025-08-07 DIAGNOSIS — M54.50 LOW BACK PAIN, UNSPECIFIED: ICD-10-CM

## 2025-08-07 PROCEDURE — 64483 NJX AA&/STRD TFRM EPI L/S 1: CPT | Mod: LT

## 2025-08-07 PROCEDURE — 76000 FLUOROSCOPY <1 HR PHYS/QHP: CPT
